# Patient Record
Sex: FEMALE | Race: WHITE
[De-identification: names, ages, dates, MRNs, and addresses within clinical notes are randomized per-mention and may not be internally consistent; named-entity substitution may affect disease eponyms.]

---

## 2023-07-07 ENCOUNTER — HOSPITAL ENCOUNTER (OUTPATIENT)
Dept: HOSPITAL 47 - FBPOP | Age: 28
Discharge: HOME | End: 2023-07-07
Attending: OBSTETRICS & GYNECOLOGY
Payer: COMMERCIAL

## 2023-07-07 VITALS
SYSTOLIC BLOOD PRESSURE: 127 MMHG | TEMPERATURE: 97.3 F | RESPIRATION RATE: 16 BRPM | HEART RATE: 79 BPM | DIASTOLIC BLOOD PRESSURE: 68 MMHG

## 2023-07-07 DIAGNOSIS — Z3A.23: ICD-10-CM

## 2023-07-07 DIAGNOSIS — O26.852: Primary | ICD-10-CM

## 2023-07-07 PROCEDURE — 76805 OB US >/= 14 WKS SNGL FETUS: CPT

## 2023-07-07 PROCEDURE — 99213 OFFICE O/P EST LOW 20 MIN: CPT

## 2023-07-07 NOTE — US
EXAMINATION TYPE: US OB >= 14 wk fetus

 

DATE OF EXAM: 7/7/2023

 

COMPARISON: None

 

CLINICAL INDICATION: Female, 28 years old with history of vaginal bleeding; spotting hx of low lying 
placenta previous done at Dr. Terrell's office.

 

TECHNIQUE:  Transabdominal (TA)

 

GESTATIONAL AGE / DATING

Physician Established: (23 weeks/5 days) 

** EDC:  10/29/2023

Dates by LMP: (23 weeks/5 days) 

** EDC: 10/29/2023

Dates by First Scan: No previous this is first 

Dates by Current Scan: (23 weeks/2 days) 

** EDC: 11/01/2023 

 

 

FETAL SURVEY

IUP:  Single

PLACENTA: Anterior     

PREVIA:  Low Lying

** SHERIDAN: 12.6 cm Normal

CERVICAL LENGTH (transabdominal: norm > 3.0cm): 3.2 cm

 

FETAL BIOMETRY

PRESENTATION:  Vertex

FETAL LIE:  Longitudinal

BPD: 5.9 cm

**  24 weeks / 0 days

HC: 21.61 cm

**  23 weeks / 5 days

AC: 19.46 cm

**  24 weeks / 1 days

FL: 4.1 cm

**  23 weeks / 2 days

ESTIMATED FETAL WEIGHT IN GRAMS:  625.15 grams

ESTIMATED FETAL WEIGHT IN LBS/OZ:  1 lbs. 6 oz. 

WEIGHT PERCENTAGE BASED ON ESTABLISHED DATES:   43.1%

HC/AC: 1.11cm Normal

FL/AC: 20.95cm Normal 

HEART RATE:  150 bpm 

RHYTHM:  Normal

 

 

 

 

 

 

 

IMPRESSION:

1. There is a low-lying placenta previa. Could not exclude a component of marginal placenta previa.

2. Viable pregnancy of 23 weeks 2 days with a heart rate of 150 bpm.

## 2023-07-08 NOTE — P.MSEPDOC
Presenting Problems





- Arrival Data


Date of Arrival on Unit: 23


Time of Arrival on Unit: 10:39


Mode of Transport: Portable





- Complaint


OB-Reason for Admission/Chief Complaint: Vaginal Bleeding


Comment: Patient states she had vaginal bleeding around midnight requiring her 

to wear a panty liner, patient states she does not have active bleeding now just

brown blood when she wipes.





Prenatal Medical History





- Pregnancy Information


: 1


Para: 0


Term: 0


: 0


Abortions: Spontaneous or Elective: 0


Number of Living Children: 0





- Gestational Age


Gestational Age by JAMAR (wks/days): 23 Weeks and 5 Days





Review of Systems





- Review of Systems


Constitutional: No problems


Breast: No problems


ENT: No problems


Cardiovascular: No problems


Respiratory: No problems


Gastrointestinal: No problems


Genitourinary: No problems


Musculoskeletal: No problems


Neurological: No problems


Skin: No problems





Vital Signs





- Temperature


Temperature: 97.3 F


Temperature Source: Temporal Artery Scan





- Pulse


  ** Right Sitting


Pulse Rate: 79


Pulse Assessment Method: Automatic Cuff





- Respirations


Respiratory Rate: 16


Oxygen Delivery Method: Room Air


O2 Sat by Pulse Oximetry: 99





- Blood Pressure


  ** Right Arm Sitting


Blood Pressure: 127/68


Blood Pressure Mean: 87


Blood Pressure Source: Automatic Cuff





Medical Screen Scoring





- Cervical Exam


Membranes: Intact





- Uterine Contractions


Frequency From (mins): 0


Frequency To (mins): 0


Duration From (seconds): 0


Duration To (seconds): 0


Intensity: Absent


Resting: Soft to palpation





- Fetal Assessment - Baby A


Baseline FHR: 140


Fetal Heart Rate - NICHD Category: Category I (Normal)


NST: Reactive





Physician Notification





- Physician Notified


Physician Notified Date: 23


Physician Notified Time: 10:45


Physician: Xander


New Order Received: Yes





- Notification Comment


Comment: Dr Terrell ordered a spec exam to check for active bleeding and a 

complete OB US.





Maternal Fetal Triage Index





- Maternal Fetal Triage Index


Presenting for scheduled procedure w/no complaint: No





- Stat/Priority 1


Stat Priority 1: No





- Urgent/Priority 2


Urgent Priority 2: No





- Prompt/Priority 3


Prompt Priority 3: Yes


Criteria Met for Priority 3: Patient states she had heavy vaginal bleeding 

around midnight filling a panty liner, patient states she is no longer having 

bright red blood, just brown blood when she wipes.





Disposition





- Disposition


OB Disposition: Discharge to home


I agree with the RN Medical Screening Exam: Yes


Case reviewed; plan agreed upon as documented in EMR&OBIX.: Yes


Diagnosis: SPOTTING COMPLICATING PREGNANCY, SECOND TRIMESTER (Patient presents 

to labor and delivery with an episode of vaginal bleeding in the middle the 

night.  Patient has a known low-lying placenta without evidence of placenta 

previa.  Patient is having no bleeding here.  Ultrasound shows no evidence of 

abruption or other complicating process although she does still appear to have a

low-lying placenta a could not rule out marginal previa.  Patient is felt to be 

stable for discharge home since she was having no bleeding on exam on pelvic 

rest.  She'll follow up with me in 3 days.  She was given strict instructions 

return of significant bleeding or concerns.)